# Patient Record
Sex: FEMALE | Race: WHITE | URBAN - METROPOLITAN AREA
[De-identification: names, ages, dates, MRNs, and addresses within clinical notes are randomized per-mention and may not be internally consistent; named-entity substitution may affect disease eponyms.]

---

## 2018-10-01 ENCOUNTER — OFFICE VISIT (OUTPATIENT)
Dept: SURGERY | Age: 53
End: 2018-10-01

## 2018-10-01 VITALS
HEIGHT: 67 IN | SYSTOLIC BLOOD PRESSURE: 116 MMHG | RESPIRATION RATE: 16 BRPM | OXYGEN SATURATION: 97 % | DIASTOLIC BLOOD PRESSURE: 81 MMHG | TEMPERATURE: 98.3 F | WEIGHT: 166 LBS | HEART RATE: 74 BPM | BODY MASS INDEX: 26.06 KG/M2

## 2018-10-01 DIAGNOSIS — L08.9 INFECTED SEBACEOUS CYST: Primary | ICD-10-CM

## 2018-10-01 DIAGNOSIS — L72.3 INFECTED SEBACEOUS CYST: Primary | ICD-10-CM

## 2018-10-01 NOTE — PROGRESS NOTES
Surgery Consult:  Infected chest wall cyst  Requesting physician:  Dr. Cordero Sender    Subjective:   Patient 47 y.o.  female has had small cyst on her anterior mid chest for about 1 year. It's been slowly increasing in size. It hasn't been bothering her until 3 weeks ago when it acutely got bigger and painful. Patient was seen in PCP office on 9/21/18 and was prescribed Bactrim, but no improvement. Patient was seen in PCP office again on 9/25/18 and underwent I&D. Antibiotic was also changed to doxycycline reportedly due to culture result. Result currently not available. Patient had packing removed on 9/27/18 and been keeping dry dressing. Patient reports that her pain and swelling has much improved since I&D. No F/C/S. Past Medical & Surgical History:  History reviewed. No pertinent past medical history. History reviewed. No pertinent surgical history. Social History:  Social History     Social History    Marital status: SINGLE     Spouse name: N/A    Number of children: N/A    Years of education: N/A     Occupational History    Not on file. Social History Main Topics    Smoking status: Never Smoker    Smokeless tobacco: Never Used    Alcohol use Yes    Drug use: No    Sexual activity: Yes     Partners: Male     Birth control/ protection: Pill     Other Topics Concern    Not on file     Social History Narrative    No narrative on file        Family History:  Family History   Problem Relation Age of Onset    Cancer Father      brain tumor    Cancer Brother      lymphoma        Medications:  No current outpatient prescriptions on file. No current facility-administered medications for this visit. Allergies: Allergies   Allergen Reactions    Penicillins Anaphylaxis       Review of Systems  A comprehensive review of systems was negative except for that written in the HPI.     Objective:     Exam:    Visit Vitals    /81 (BP 1 Location: Right arm, BP Patient Position: Sitting)    Pulse 74    Temp 98.3 °F (36.8 °C) (Oral)    Resp 16    Ht 5' 6.5\" (1.689 m)    Wt 75.3 kg (166 lb)    SpO2 97%    BMI 26.39 kg/m2     General appearance: alert, cooperative, no distress, appears stated age  Lungs: clear to auscultation bilaterally  Heart: regular rate and rhythm  Extremities: extremities normal, atraumatic, no cyanosis or edema. PATRICK. Skin: Skin color, texture, turgor normal.  Anterior chest mid sternum with 0.5 x 0.5 cm open wound with purulent drainage at the base. +erythema and induration around the wound measuring 2 x 2 cm. Wound irrigated with H2O2 followed by neosporin and dry dressing. Neurologic: Grossly normal    Assessment:     Infected chest wall cyst s/p I&D    Plan:     Continue antibiotics  Neosporin  Possible excision after wound heals vs observation discussed with the patient. Risks, benefit, and alternative to surgery was discussed with the patient. The risks of surgery include but not limited to infection, bleeding, and recurrence. Patient will think about it. All questions answered.   RTC in 2-3 weeks

## 2018-10-01 NOTE — PROGRESS NOTES
Chan Durán is a 47 y.o. female     Chief Complaint   Patient presents with    Cyst     krystle cyst ref by Dr. Sandra Coppola /81 (BP 1 Location: Right arm, BP Patient Position: Sitting)    Pulse 74    Temp 98.3 °F (36.8 °C) (Oral)    Resp 16    Ht 5' 6.5\" (1.689 m)    Wt 166 lb (75.3 kg)    SpO2 97%    BMI 26.39 kg/m2       Health Maintenance Due   Topic Date Due    DTaP/Tdap/Td series (1 - Tdap) 12/16/1987    PAP AKA CERVICAL CYTOLOGY  12/16/1987    Shingrix Vaccine Age 50> (1 of 2) 12/16/2016    BREAST CANCER SCRN MAMMOGRAM  12/16/2016    FOBT Q 1 YEAR AGE 50-75  12/16/2016    Influenza Age 9 to Adult  08/01/2018       1. Have you been to the ER, urgent care clinic since your last visit? Hospitalized since your last visit? No    2. Have you seen or consulted any other health care providers outside of the [address] [place] [address] since your last visit? Include any pap smears or colon screening. No    Do you have an Advance Directive?  No

## 2018-10-01 NOTE — MR AVS SNAPSHOT
Jason Adamson, [address] [facility], [address] [place] [address] [facility] 
[phone] Patient: Chan Durán MRN: [id number] :1965 Visit Information Date & Time Provider Department Dept. Phone Encounter #  
 10/1/2018  3:20 PM Maria Alejandra Hutchinson MD [facility] [place] [address] [id number] Your Appointments 10/26/2018  1:00 PM  
ESTABLISHED PATIENT with Maria Alejandra Hutchinson MD  
[facility] Dr. Chris Randhawa [facility] [facility] Appt Note: 3 wk f/u seb cyst  
 [address], [address], [address] [phone]  
[address] [facility], [address] [facility] [address] [phone] Upcoming Health Maintenance Date Due DTaP/Tdap/Td series (1 - Tdap) 1987 PAP AKA CERVICAL CYTOLOGY 1987 Shingrix Vaccine Age 50> (1 of 2) 2016 BREAST CANCER SCRN MAMMOGRAM 2016 FOBT Q 1 YEAR AGE 50-75 2016 Influenza Age 5 to Adult 2018 Allergies as of 10/1/2018  Review Complete On: 10/1/2018 By: Charles Kruse Severity Noted Reaction Type Reactions Penicillins High 10/01/2018    Anaphylaxis Current Immunizations  Never Reviewed No immunizations on file. Not reviewed this visit Vitals BP Pulse Temp Resp Height(growth percentile) Weight(growth percentile) 116/81 (BP 1 Location: Right arm, BP Patient Position: Sitting) 74 98.3 °F (36.8 °C) (Oral) 16 5' 6.5\" (1.689 m) 166 lb (75.3 kg) SpO2 BMI Smoking Status 97% 26.39 kg/m2 Never Smoker BMI and BSA Data Body Mass Index Body Surface Area  
 26.39 kg/m 2 1.88 m 2 Your Updated Medication List  
  
Notice  As of 10/1/2018  4:20 PM  
 You have not been prescribed any medications. Introducing \[facility]\"" & HEALTH SERVICES!    
 Sherin Mack introduces [employer] patient portal. Now you can access parts of your medical record, email your doctor's office, and request medication refills online. 1. In your internet browser, go to [email or web address] 2. Click on the First Time User? Click Here link in the Sign In box. You will see the New Member Sign Up page. 3. Enter your [employer] Access Code exactly as it appears below. You will not need to use this code after youve completed the sign-up process. If you do not sign up before the expiration date, you must request a new code. · [employer] Access Code: [email or web address] Expires: 12/30/2018  3:29 PM 
 
4. Enter the last four digits of your Social Security Number (xxxx) and Date of Birth (mm/dd/yyyy) as indicated and click Submit. You will be taken to the next sign-up page. 5. Create a [employer] ID. This will be your [employer] login ID and cannot be changed, so think of one that is secure and easy to remember. 6. Create a [employer] password. You can change your password at any time. 7. Enter your Password Reset Question and Answer. This can be used at a later time if you forget your password. 8. Enter your e-mail address. You will receive e-mail notification when new information is available in [address] 9. Click Sign Up. You can now view and download portions of your medical record. 10. Click the Download Summary menu link to download a portable copy of your medical information. If you have questions, please visit the Frequently Asked Questions section of the [employer] website. Remember, [employer] is NOT to be used for urgent needs. For medical emergencies, dial 911. Now available from your iPhone and Android! Please provide this summary of care documentation to your next provider. Your primary care clinician is listed as Magdalena Whitman. If you have any questions after today's visit, please call [phone].

## 2018-11-09 ENCOUNTER — DOCUMENTATION ONLY (OUTPATIENT)
Dept: SURGERY | Age: 53
End: 2018-11-09